# Patient Record
Sex: FEMALE | Race: WHITE | Employment: OTHER | URBAN - METROPOLITAN AREA
[De-identification: names, ages, dates, MRNs, and addresses within clinical notes are randomized per-mention and may not be internally consistent; named-entity substitution may affect disease eponyms.]

---

## 2018-06-26 PROBLEM — Z76.89 ESTABLISHING CARE WITH NEW DOCTOR, ENCOUNTER FOR: Status: ACTIVE | Noted: 2018-06-26

## 2018-06-26 PROBLEM — E78.2 MIXED HYPERLIPIDEMIA: Status: ACTIVE | Noted: 2018-06-26

## 2018-06-26 PROBLEM — M54.50 CHRONIC MIDLINE LOW BACK PAIN WITHOUT SCIATICA: Status: ACTIVE | Noted: 2018-06-26

## 2018-06-26 PROBLEM — E55.9 VITAMIN D DEFICIENCY: Status: ACTIVE | Noted: 2018-06-26

## 2018-06-26 PROBLEM — N65.0 BREAST RECONSTRUCTION DEFORMITY: Status: ACTIVE | Noted: 2018-06-26

## 2018-06-26 PROBLEM — R73.9 ELEVATED BLOOD SUGAR: Status: ACTIVE | Noted: 2018-06-26

## 2018-06-26 PROBLEM — G43.009 MIGRAINE WITHOUT AURA AND WITHOUT STATUS MIGRAINOSUS, NOT INTRACTABLE: Status: ACTIVE | Noted: 2018-06-26

## 2018-06-26 PROBLEM — G89.29 CHRONIC MIDLINE LOW BACK PAIN WITHOUT SCIATICA: Status: ACTIVE | Noted: 2018-06-26

## 2018-06-27 PROBLEM — E78.2 MIXED HYPERLIPIDEMIA: Chronic | Status: ACTIVE | Noted: 2018-06-26

## 2018-06-27 PROBLEM — N65.0 BREAST RECONSTRUCTION DEFORMITY: Chronic | Status: ACTIVE | Noted: 2018-06-26

## 2018-06-27 PROBLEM — G89.29 CHRONIC MIDLINE LOW BACK PAIN WITHOUT SCIATICA: Chronic | Status: ACTIVE | Noted: 2018-06-26

## 2018-06-27 PROBLEM — E55.9 VITAMIN D DEFICIENCY: Chronic | Status: ACTIVE | Noted: 2018-06-26

## 2018-06-27 PROBLEM — M54.50 CHRONIC MIDLINE LOW BACK PAIN WITHOUT SCIATICA: Chronic | Status: ACTIVE | Noted: 2018-06-26

## 2018-07-05 PROBLEM — Z85.3 HISTORY OF BREAST CANCER: Chronic | Status: ACTIVE | Noted: 2018-07-05

## 2018-07-05 PROBLEM — Z85.3 HISTORY OF BREAST CANCER: Status: ACTIVE | Noted: 2018-07-05

## 2018-07-09 PROBLEM — Z79.899 ENCOUNTER FOR LONG-TERM (CURRENT) USE OF MEDICATIONS: Status: ACTIVE | Noted: 2018-07-09

## 2018-07-09 PROBLEM — Z79.899 ENCOUNTER FOR LONG-TERM (CURRENT) USE OF MEDICATIONS: Chronic | Status: ACTIVE | Noted: 2018-07-09

## 2018-07-17 PROBLEM — Z01.419 WOMEN'S ANNUAL ROUTINE GYNECOLOGICAL EXAMINATION: Status: ACTIVE | Noted: 2018-07-17

## 2018-07-27 VITALS — HEIGHT: 65 IN | WEIGHT: 150 LBS | BODY MASS INDEX: 24.99 KG/M2

## 2018-07-27 NOTE — H&P
History and Physical    Patient: Savannah Escalona MRN: [id number]  SSN: [id number]    YOB: 1950  Age: 76 y.o. Sex: female      Vital Signs: /70 P74 R16 SPO2 97%  Visit Vitals    Ht 5' 5\" (1.651 m)    Wt 68 kg (150 lb)    BMI 24.96 kg/m2        Allergies:    Allergies   Allergen Reactions    Codeine Other (comments)     intolerance    Pneumococcal 23-Andreina Ps Vaccine Other (comments)     discomfort    [place] Flextouch U-100 [Insulin Degludec] Other (comments)     headaches       Chief Complaint: disproportion of reconstructed breast, capsular contracture right breast     History of Present Illness: disproportion of reconstructed breast, capsular contracture right breast     Justification for Procedure: disproportion of reconstructed breast, capsular contracture right breast     History:  Past Medical History:   Diagnosis Date    Acute serous otitis media     Acute urticaria     Anemia     Arthritis     Asthma     Breast cancer (HCC)     Depressive disorder     Diabetes mellitus ([address]     Duodenitis     Eczema herpeticum     Epigastric abdominal pain     Essential hypertension     H/O actinic keratosis     H/O blood clots     H/O gastroesophageal reflux (GERD)     H/O headache     H/O seasonal allergies     Hiatal hernia with gastroesophageal reflux     History of breast cancer 7/5/2018    Hx of migraine headaches     Hyperlipidemia     Migraine without aura and without status migrainosus, not intractable 3/22/3125    Other follicular cysts of the skin and subcutaneous tissue     Overweight     Personal history of peptic ulcer disease     Plantar fasciitis     Pulmonary embolism ([address] 2001    Rectal bleeding     Uncontrolled type 2 diabetes mellitus without complication, with long-term current use of insulin ([address] 6/27/2018      Past Surgical History:   Procedure Laterality Date    HX BREAST AUGMENTATION      1 silicone 1 saline implant    HX BREAST BIOPSY      HX MASTECTOMY      x 2    HX OTHER SURGICAL      gastropexy-tummy tuck    HX OTHER SURGICAL      laparoscopic reduction and repair of hiatal hernia    HX TONSILLECTOMY        Social History     Social History    Marital status: UNKNOWN     Spouse name: N/A    Number of children: N/A    Years of education: N/A     Social History Main Topics    Smoking status: Former Smoker    Smokeless tobacco: Never Used    Alcohol use Yes      Comment: occ    Drug use: No    Sexual activity: No     Other Topics Concern    Caffeine Concern No    Exercise Yes    Seat Belt Yes     Social History Narrative      Family History   Problem Relation Age of Onset    Heart Disease Father     Diabetes Father     High Cholesterol Father     No Known Problems Mother     Breast Cancer Maternal Grandmother     Breast Cancer Paternal Aunt     Colon Cancer Neg Hx     Ovarian Cancer Neg Hx     Uterine Cancer Neg Hx        Medications:   Prior to Admission medications    Medication Sig Start Date End Date Taking? Authorizing Provider   insulin detemir U-100 (LEVEMIR FLEXTOUCH U-100 INSULN) 100 unit/mL (3 mL) inpn 20 Units by SubCUTAneous route nightly. Indications: type 2 diabetes mellitus 7/26/18   Conrad Jose MD   atorvastatin (LIPITOR) 20 mg tablet Take 1 Tab by mouth daily. Indications: mixed hyperlipidemia 7/9/18   Conrad Jose MD   OTHER Right and left breasts prosthesis and bras for proper weight balance: DX: Z85.3  Medically necessary 7/5/18   Conrad Jose MD   Blood-Glucose Meter monitoring kit USE TO CHECK BLOOD SUGARS 2 x DAILY 6/28/18   Conrad Jose MD   lancets 28 gauge misc by SubCUTAneous route two (2) times a day.  6/28/18   Conrad Jose MD   glucose blood VI test strips (BLOOD GLUCOSE TEST) strip USE TO CHECK BLOOD SUGARS 2 x DAILY 6/28/18   Conrad Jose MD   Insulin Needles, Disposable, (MICHI PEN NEEDLE) 32 gauge x 5/32\" ndle Use daily 6/28/18   Conrad Jose MD   HYDROcodone-acetaminophen (NORCO) 5-325 mg per tablet Take 1 Tab by mouth every six (6) hours as needed for Pain. Max Daily Amount: 4 Tabs.  Indications: Pain 6/26/18   Trudy Moran MD       Physical Exam:   Heart: regular rate and rhythm, S1, S2 normal, no murmur, click, rub or gallop   Lungs: clear to auscultation bilaterally   Surgical Site: bilateral breasts     Findings/Diagnosis: capsular contracture right breast, disproportion of reconstructed breast    Plan of Care/Planned Procedure: right breast capsulectomy with implant exchange, left breast implant exchange, excise cyst right shoulder    Signed By: Be Felix MD    July 27, 2018

## 2018-07-30 ENCOUNTER — ANESTHESIA EVENT (OUTPATIENT)
Dept: SURGERY | Age: 68
End: 2018-07-30
Payer: MEDICARE

## 2018-07-31 ENCOUNTER — HOSPITAL ENCOUNTER (OUTPATIENT)
Dept: SURGERY | Age: 68
Discharge: HOME | End: 2018-07-31
Payer: MEDICARE

## 2018-07-31 LAB
ATRIAL RATE: 69 BPM
CALCULATED P AXIS, ECG09: 59 DEGREES
CALCULATED R AXIS, ECG10: -11 DEGREES
CALCULATED T AXIS, ECG11: 27 DEGREES
DIAGNOSIS, 93000: NORMAL
GLUCOSE BLD STRIP.AUTO-MCNC: 162 MG/DL (ref 65–100)
P-R INTERVAL, ECG05: 152 MS
Q-T INTERVAL, ECG07: 392 MS
QRS DURATION, ECG06: 76 MS
QTC CALCULATION (BEZET), ECG08: 420 MS
VENTRICULAR RATE, ECG03: 69 BPM

## 2018-07-31 PROCEDURE — 82962 GLUCOSE BLOOD TEST: CPT

## 2018-07-31 PROCEDURE — 93005 ELECTROCARDIOGRAM TRACING: CPT | Performed by: ANESTHESIOLOGY

## 2018-08-01 ENCOUNTER — HOSPITAL ENCOUNTER (OUTPATIENT)
Age: 68
Setting detail: OUTPATIENT SURGERY
Discharge: HOME | End: 2018-08-01
Attending: SURGERY | Admitting: SURGERY
Payer: MEDICARE

## 2018-08-01 ENCOUNTER — ANESTHESIA (OUTPATIENT)
Dept: SURGERY | Age: 68
End: 2018-08-01
Payer: MEDICARE

## 2018-08-01 VITALS
HEART RATE: 57 BPM | TEMPERATURE: 97.5 F | SYSTOLIC BLOOD PRESSURE: 128 MMHG | HEIGHT: 65 IN | RESPIRATION RATE: 17 BRPM | BODY MASS INDEX: 25.81 KG/M2 | OXYGEN SATURATION: 92 % | DIASTOLIC BLOOD PRESSURE: 75 MMHG | WEIGHT: 154.9 LBS

## 2018-08-01 LAB
GLUCOSE BLD STRIP.AUTO-MCNC: 127 MG/DL (ref 65–100)
GLUCOSE BLD STRIP.AUTO-MCNC: 148 MG/DL (ref 65–100)

## 2018-08-01 PROCEDURE — 77030002933 HC SUT MCRYL J&J -A: Performed by: SURGERY

## 2018-08-01 PROCEDURE — 77030008477 HC STYL SATN SLP COVD -A: Performed by: ANESTHESIOLOGY

## 2018-08-01 PROCEDURE — 82962 GLUCOSE BLOOD TEST: CPT

## 2018-08-01 PROCEDURE — 77030018836 HC SOL IRR NACL ICUM -A: Performed by: SURGERY

## 2018-08-01 PROCEDURE — 77030032490 HC SLV COMPR SCD KNE COVD -B: Performed by: SURGERY

## 2018-08-01 PROCEDURE — 74011250636 HC RX REV CODE- 250/636: Performed by: SURGERY

## 2018-08-01 PROCEDURE — 76060000034 HC ANESTHESIA 1.5 TO 2 HR: Performed by: SURGERY

## 2018-08-01 PROCEDURE — 77030008467 HC STPLR SKN COVD -B: Performed by: SURGERY

## 2018-08-01 PROCEDURE — 77030011283 HC ELECTRD NDL COVD -A: Performed by: SURGERY

## 2018-08-01 PROCEDURE — 76210000016 HC OR PH I REC 1 TO 1.5 HR: Performed by: SURGERY

## 2018-08-01 PROCEDURE — C1789 PROSTHESIS, BREAST, IMP: HCPCS | Performed by: SURGERY

## 2018-08-01 PROCEDURE — 74011250636 HC RX REV CODE- 250/636: Performed by: ANESTHESIOLOGY

## 2018-08-01 PROCEDURE — 76210000020 HC REC RM PH II FIRST 0.5 HR: Performed by: SURGERY

## 2018-08-01 PROCEDURE — 74011250637 HC RX REV CODE- 250/637: Performed by: ANESTHESIOLOGY

## 2018-08-01 PROCEDURE — 74011250636 HC RX REV CODE- 250/636

## 2018-08-01 PROCEDURE — 76010000162 HC OR TIME 1.5 TO 2 HR INTENSV-TIER 1: Performed by: SURGERY

## 2018-08-01 PROCEDURE — 77030016570 HC BLNKT BAIR HGGR 3M -B: Performed by: ANESTHESIOLOGY

## 2018-08-01 PROCEDURE — 77030008703 HC TU ET UNCUF COVD -A: Performed by: ANESTHESIOLOGY

## 2018-08-01 PROCEDURE — 77030011640 HC PAD GRND REM COVD -A: Performed by: SURGERY

## 2018-08-01 PROCEDURE — 74011000250 HC RX REV CODE- 250

## 2018-08-01 PROCEDURE — 74011000250 HC RX REV CODE- 250: Performed by: SURGERY

## 2018-08-01 PROCEDURE — 77030020782 HC GWN BAIR PAWS FLX 3M -B: Performed by: ANESTHESIOLOGY

## 2018-08-01 DEVICE — LOW HEIGHT, MODERATE PLUS PROFILE SILICONE GEL-FILLED BREAST IMPLANT, 465CC  TEXTURED CONTOUR SILICONE
Type: IMPLANTABLE DEVICE | Site: BREAST | Status: FUNCTIONAL
Brand: MENTOR MEMORYSHAPE BREAST IMPLANT

## 2018-08-01 RX ORDER — GLYCOPYRROLATE 0.2 MG/ML
INJECTION INTRAMUSCULAR; INTRAVENOUS AS NEEDED
Status: DISCONTINUED | OUTPATIENT
Start: 2018-08-01 | End: 2018-08-01 | Stop reason: HOSPADM

## 2018-08-01 RX ORDER — NALOXONE HYDROCHLORIDE 0.4 MG/ML
0.2 INJECTION, SOLUTION INTRAMUSCULAR; INTRAVENOUS; SUBCUTANEOUS AS NEEDED
Status: DISCONTINUED | OUTPATIENT
Start: 2018-08-01 | End: 2018-08-01 | Stop reason: HOSPADM

## 2018-08-01 RX ORDER — KETOROLAC TROMETHAMINE 30 MG/ML
INJECTION, SOLUTION INTRAMUSCULAR; INTRAVENOUS AS NEEDED
Status: DISCONTINUED | OUTPATIENT
Start: 2018-08-01 | End: 2018-08-01 | Stop reason: HOSPADM

## 2018-08-01 RX ORDER — CEFAZOLIN SODIUM/WATER 2 G/20 ML
2 SYRINGE (ML) INTRAVENOUS ONCE
Status: COMPLETED | OUTPATIENT
Start: 2018-08-01 | End: 2018-08-01

## 2018-08-01 RX ORDER — ROCURONIUM BROMIDE 10 MG/ML
INJECTION, SOLUTION INTRAVENOUS AS NEEDED
Status: DISCONTINUED | OUTPATIENT
Start: 2018-08-01 | End: 2018-08-01 | Stop reason: HOSPADM

## 2018-08-01 RX ORDER — SODIUM CHLORIDE, SODIUM LACTATE, POTASSIUM CHLORIDE, CALCIUM CHLORIDE 600; 310; 30; 20 MG/100ML; MG/100ML; MG/100ML; MG/100ML
75 INJECTION, SOLUTION INTRAVENOUS CONTINUOUS
Status: DISCONTINUED | OUTPATIENT
Start: 2018-08-01 | End: 2018-08-01 | Stop reason: HOSPADM

## 2018-08-01 RX ORDER — FENTANYL CITRATE 50 UG/ML
INJECTION, SOLUTION INTRAMUSCULAR; INTRAVENOUS AS NEEDED
Status: DISCONTINUED | OUTPATIENT
Start: 2018-08-01 | End: 2018-08-01 | Stop reason: HOSPADM

## 2018-08-01 RX ORDER — FENTANYL CITRATE 50 UG/ML
100 INJECTION, SOLUTION INTRAMUSCULAR; INTRAVENOUS ONCE
Status: DISCONTINUED | OUTPATIENT
Start: 2018-08-01 | End: 2018-08-01 | Stop reason: HOSPADM

## 2018-08-01 RX ORDER — SODIUM CHLORIDE, SODIUM LACTATE, POTASSIUM CHLORIDE, CALCIUM CHLORIDE 600; 310; 30; 20 MG/100ML; MG/100ML; MG/100ML; MG/100ML
INJECTION, SOLUTION INTRAVENOUS
Status: DISCONTINUED | OUTPATIENT
Start: 2018-08-01 | End: 2018-08-01 | Stop reason: HOSPADM

## 2018-08-01 RX ORDER — DEXAMETHASONE SODIUM PHOSPHATE 4 MG/ML
INJECTION, SOLUTION INTRA-ARTICULAR; INTRALESIONAL; INTRAMUSCULAR; INTRAVENOUS; SOFT TISSUE AS NEEDED
Status: DISCONTINUED | OUTPATIENT
Start: 2018-08-01 | End: 2018-08-01 | Stop reason: HOSPADM

## 2018-08-01 RX ORDER — ONDANSETRON 2 MG/ML
INJECTION INTRAMUSCULAR; INTRAVENOUS AS NEEDED
Status: DISCONTINUED | OUTPATIENT
Start: 2018-08-01 | End: 2018-08-01 | Stop reason: HOSPADM

## 2018-08-01 RX ORDER — ACETAMINOPHEN 500 MG
1000 TABLET ORAL ONCE
Status: COMPLETED | OUTPATIENT
Start: 2018-08-01 | End: 2018-08-01

## 2018-08-01 RX ORDER — GABAPENTIN 300 MG/1
300 CAPSULE ORAL ONCE
Status: COMPLETED | OUTPATIENT
Start: 2018-08-01 | End: 2018-08-01

## 2018-08-01 RX ORDER — PROPOFOL 10 MG/ML
INJECTION, EMULSION INTRAVENOUS AS NEEDED
Status: DISCONTINUED | OUTPATIENT
Start: 2018-08-01 | End: 2018-08-01 | Stop reason: HOSPADM

## 2018-08-01 RX ORDER — OXYCODONE HYDROCHLORIDE 5 MG/1
5 TABLET ORAL
Status: DISCONTINUED | OUTPATIENT
Start: 2018-08-01 | End: 2018-08-01 | Stop reason: HOSPADM

## 2018-08-01 RX ORDER — MIDAZOLAM HYDROCHLORIDE 1 MG/ML
2 INJECTION, SOLUTION INTRAMUSCULAR; INTRAVENOUS ONCE
Status: DISCONTINUED | OUTPATIENT
Start: 2018-08-01 | End: 2018-08-01 | Stop reason: HOSPADM

## 2018-08-01 RX ORDER — APREPITANT 40 MG/1
40 CAPSULE ORAL ONCE
Status: COMPLETED | OUTPATIENT
Start: 2018-08-01 | End: 2018-08-01

## 2018-08-01 RX ORDER — BUPIVACAINE HYDROCHLORIDE AND EPINEPHRINE 5; 5 MG/ML; UG/ML
INJECTION, SOLUTION EPIDURAL; INTRACAUDAL; PERINEURAL AS NEEDED
Status: DISCONTINUED | OUTPATIENT
Start: 2018-08-01 | End: 2018-08-01 | Stop reason: HOSPADM

## 2018-08-01 RX ORDER — HYDROMORPHONE HYDROCHLORIDE 2 MG/ML
0.5 INJECTION, SOLUTION INTRAMUSCULAR; INTRAVENOUS; SUBCUTANEOUS
Status: DISCONTINUED | OUTPATIENT
Start: 2018-08-01 | End: 2018-08-01 | Stop reason: HOSPADM

## 2018-08-01 RX ORDER — LIDOCAINE HYDROCHLORIDE 20 MG/ML
INJECTION, SOLUTION EPIDURAL; INFILTRATION; INTRACAUDAL; PERINEURAL AS NEEDED
Status: DISCONTINUED | OUTPATIENT
Start: 2018-08-01 | End: 2018-08-01 | Stop reason: HOSPADM

## 2018-08-01 RX ORDER — NEOSTIGMINE METHYLSULFATE 1 MG/ML
INJECTION INTRAVENOUS AS NEEDED
Status: DISCONTINUED | OUTPATIENT
Start: 2018-08-01 | End: 2018-08-01 | Stop reason: HOSPADM

## 2018-08-01 RX ORDER — LIDOCAINE HYDROCHLORIDE 10 MG/ML
0.1 INJECTION INFILTRATION; PERINEURAL AS NEEDED
Status: DISCONTINUED | OUTPATIENT
Start: 2018-08-01 | End: 2018-08-01 | Stop reason: HOSPADM

## 2018-08-01 RX ORDER — MIDAZOLAM HYDROCHLORIDE 1 MG/ML
2 INJECTION, SOLUTION INTRAMUSCULAR; INTRAVENOUS
Status: DISCONTINUED | OUTPATIENT
Start: 2018-08-01 | End: 2018-08-01 | Stop reason: HOSPADM

## 2018-08-01 RX ADMIN — SODIUM CHLORIDE, SODIUM LACTATE, POTASSIUM CHLORIDE, CALCIUM CHLORIDE: 600; 310; 30; 20 INJECTION, SOLUTION INTRAVENOUS at 06:42

## 2018-08-01 RX ADMIN — FENTANYL CITRATE 100 MCG: 50 INJECTION, SOLUTION INTRAMUSCULAR; INTRAVENOUS at 07:45

## 2018-08-01 RX ADMIN — KETOROLAC TROMETHAMINE 15 MG: 30 INJECTION, SOLUTION INTRAMUSCULAR; INTRAVENOUS at 09:02

## 2018-08-01 RX ADMIN — GABAPENTIN 300 MG: 300 CAPSULE ORAL at 06:43

## 2018-08-01 RX ADMIN — Medication 2 G: at 07:47

## 2018-08-01 RX ADMIN — NEOSTIGMINE METHYLSULFATE 3 MG: 1 INJECTION INTRAVENOUS at 09:04

## 2018-08-01 RX ADMIN — DEXAMETHASONE SODIUM PHOSPHATE 4 MG: 4 INJECTION, SOLUTION INTRA-ARTICULAR; INTRALESIONAL; INTRAMUSCULAR; INTRAVENOUS; SOFT TISSUE at 08:00

## 2018-08-01 RX ADMIN — SODIUM CHLORIDE, SODIUM LACTATE, POTASSIUM CHLORIDE, AND CALCIUM CHLORIDE 25 ML/HR: 600; 310; 30; 20 INJECTION, SOLUTION INTRAVENOUS at 06:43

## 2018-08-01 RX ADMIN — ROCURONIUM BROMIDE 45 MG: 10 INJECTION, SOLUTION INTRAVENOUS at 07:45

## 2018-08-01 RX ADMIN — ROCURONIUM BROMIDE 5 MG: 10 INJECTION, SOLUTION INTRAVENOUS at 08:15

## 2018-08-01 RX ADMIN — SODIUM CHLORIDE, SODIUM LACTATE, POTASSIUM CHLORIDE, CALCIUM CHLORIDE: 600; 310; 30; 20 INJECTION, SOLUTION INTRAVENOUS at 09:04

## 2018-08-01 RX ADMIN — LIDOCAINE HYDROCHLORIDE 80 MG: 20 INJECTION, SOLUTION EPIDURAL; INFILTRATION; INTRACAUDAL; PERINEURAL at 07:45

## 2018-08-01 RX ADMIN — PROPOFOL 150 MG: 10 INJECTION, EMULSION INTRAVENOUS at 07:45

## 2018-08-01 RX ADMIN — ONDANSETRON 4 MG: 2 INJECTION INTRAMUSCULAR; INTRAVENOUS at 08:53

## 2018-08-01 RX ADMIN — ACETAMINOPHEN 1000 MG: 500 TABLET, FILM COATED ORAL at 06:44

## 2018-08-01 RX ADMIN — APREPITANT 40 MG: 40 CAPSULE ORAL at 07:06

## 2018-08-01 RX ADMIN — GLYCOPYRROLATE 0.4 MG: 0.2 INJECTION INTRAMUSCULAR; INTRAVENOUS at 09:04

## 2018-08-01 NOTE — INTERVAL H&P NOTE
H&P Update: 
Gordy Lemus was seen and examined. History and physical has been reviewed. The patient has been examined. There have been no significant clinical changes since the completion of the originally dated History and Physical. 
Patient identified by surgeon; surgical site was confirmed by patient and surgeon.  
 
Signed By: Francis Forrester MD   
 August 1, 2018 7:29 AM

## 2018-08-01 NOTE — BRIEF OP NOTE
BRIEF OPERATIVE NOTE Date of Procedure: 8/1/2018 Preoperative Diagnosis: Capsular contracture of breast implant, initial encounter Khoi Barillas Breast reconstruction disproportion [N65.1] History of malignant neoplasm of breast [Z85.3] Postoperative Diagnosis: Capsular contracture of breast implant, initial encounter Procedure(s): RIGHT BREAST CAPSULECTOMY BILATERAL BREAST  IMPLANT EXCHANGES   
 CYST EXCISION RIGHT SHOULDER Surgeon(s) and Role: Chelsea Schuster MD - Primary Surgical Assistant: 0 Surgical Staff: 
Circ-1: Criselda Sarmiento RN Scrub Tech-1: Carolyn Dallas Scrub RN-1: Delia Jean-Baptiste RN Event Time In Incision Start 0800 Incision Close 5286 Anesthesia: General  
Estimated Blood Loss: 0 Specimens: * No specimens in log * Findings: 0 Complications: 0 Implants:  
Implant Name Type Inv. Item Serial No.  Lot No. LRB No. Used Action IMPL BRST LM+ [id number] -- MEMORY SHAPE - [id number]  IMPL BRST LM+ 465ML -- MEMORY SHAPE [id number] MENT[place] [id number] Right 1 Implanted

## 2018-08-01 NOTE — OP NOTES
[facility] REPORT    Nacho Talbert  MR#: [id number]  : 1950  ACCOUNT #: [de-identified]   DATE OF SERVICE: 2018    PREOPERATIVE DIAGNOSIS:  Status post bilateral mastectomies for cancer. POSTOPERATIVE DIAGNOSIS:  Status post bilateral mastectomies for cancer. In addition, this patient has a deflated left breast implant. PROCEDURES PERFORMED:    1. Capsulectomy right breast with implant exchange. 2.  Removal of deflated saline implant, left breast.    SURGEON:  Iron Ornelas MD    ASSISTANT:  0     ANESTHESIA:  General.    ESTIMATED BLOOD LOSS: 50cc    SPECIMENS REMOVED: 0    COMPLICATIONS: 0    IMPLANTS: breast     PROCEDURE NOTE IN DETAIL:  After general anesthesia was obtained, the chest area was prepped and draped under sterile conditions. The old inframammary incision on the right side was opened with a 15 blade and dissection was carried down to the capsule. Soft tissue was elevated off the capsule anteriorly and inferiorly, medially and laterally. The intact implant was then removed. The capsule that had been released was excised and the pocket opened and hemostasis obtained with the cautery. The pocket was irrigated with Betadine and at this point, a [place] MemoryShape breast implant 465 mL was inserted and positioned appropriately. The inframammary incision was then closed with 3-0 Monocryl in an interrupted and running fashion and Steri-Strips were applied. There was no blood loss. Attention was then directed to the left breast where the mastectomy scar was opened laterally and dissection carried down beneath the muscle to a deflated breast implant. The pocket was elevated, the deflated implant removed. A sizer was inserted and it was obviously too big. A decision was made not to insert an implant at all. The incision was then closed in layers using 3-0 Monocryl in an interrupted and running fashion.   Steri-Strips were applied and the operation terminated. There was no blood loss. Addendum:  Having completed the breast surgery, attention was directed to the right shoulder where a 1.5 cm cyst was prepped and draped under sterile conditions. Elliptical excision of the cyst was carried out and closure accomplished with 4-0 Monocryl in an interrupted and running fashion. The cyst measured approximately 1.5 cm. It was not submitted to pathology. Steri-Strips were applied, an Ace wrap applied to the breast, and the operation completed.       MD JUAN Concepcion / JACKY  D: 08/01/2018 09:16     T: 08/01/2018 11:54  JOB #: [id number]

## 2018-08-01 NOTE — ANESTHESIA PREPROCEDURE EVALUATION
Anesthetic History PONV Review of Systems / Medical History Patient summary reviewed and pertinent labs reviewed Pulmonary Asthma : well controlled Neuro/Psych Psychiatric history (Depression ) Cardiovascular Dysrhythmias (Denies) Exercise tolerance: >4 METS Comments: Denies CP, SOB or changes in functional status GI/Hepatic/Renal 
  
GERD: well controlled PUD Endo/Other Diabetes (Newly diagnosed: A1C >15.5 recently but now better controlled. ): type 2 Arthritis Other Findings Physical Exam 
 
Airway Mallampati: II 
TM Distance: 4 - 6 cm Neck ROM: normal range of motion Mouth opening: Normal 
 
 Cardiovascular Rhythm: regular Rate: normal 
 
 
 
 Dental 
 
Dentition: Caps/crowns Pulmonary Breath sounds clear to auscultation Abdominal 
GI exam deferred Other Findings Anesthetic Plan ASA: 3 Anesthesia type: general 
 
 
 
 
Induction: Intravenous Anesthetic plan and risks discussed with: Patient Due to poorly controlled DM will plan for GETA.

## 2018-08-01 NOTE — IP AVS SNAPSHOT
[facility] 
 
 
 [address] [facility] [address] 
[phone] Patient: Carmen Villalobos MRN: [id number] GAJ:[id number] About your hospitalization You were admitted on:  August 1, 2018 You last received care in the:  [facility] PACU You were discharged on:  August 1, 2018 Why you were hospitalized Your primary diagnosis was:  Not on File Follow-up Information Follow up With Details Comments Contact Info Hawa Davidson MD   [facility] [place] [address] Akua Turner 
[phone] Eppie Gosselin, MD Follow up on 8/8/2018 1:30  [facility] [address] 
[phone] Discharge Orders None A check lis indicates which time of day the medication should be taken. My Medications CHANGE how you take these medications Instructions Each Dose to Equal  
 Morning Noon Evening Bedtime  
 atorvastatin 20 mg tablet Commonly known as:  LIPITOR What changed:  when to take this Your last dose was: Your next dose is: Take 1 Tab by mouth daily. Indications: mixed hyperlipidemia 20 mg CONTINUE taking these medications Instructions Each Dose to Equal  
 Morning Noon Evening Bedtime HYDROcodone-acetaminophen 5-325 mg per tablet Commonly known as:  Soniaha Fothergill Your last dose was: Your next dose is: Take 1 Tab by mouth every six (6) hours as needed for Pain. Max Daily Amount: 4 Tabs. Indications: Pain 1 Tab  
    
   
   
   
  
 insulin detemir U-100 100 unit/mL (3 mL) Inpn Commonly known as:  LEVEMIR FLEXTOUCH U-100 INSULN Your last dose was: Your next dose is:    
   
   
 20 Units by SubCUTAneous route nightly. Indications: type 2 diabetes mellitus 20 Units VITAMIN C 500 mg tablet Generic drug:  ascorbic acid (vitamin C) Your last dose was: Your next dose is: Take 1,000 mg by mouth daily. 1000 mg Opioid Education Prescription Opioids: What You Need to Know: 
 
Prescription opioids can be used to help relieve moderate-to-severe pain and are often prescribed following a surgery or injury, or for certain health conditions. These medications can be an important part of treatment but also come with serious risks. Opioids are strong pain medicines. Examples include hydrocodone, oxycodone, fentanyl, and morphine. Heroin is an example of an illegal opioid. It is important to work with your health care provider to make sure you are getting the safest, most effective care. WHAT ARE THE RISKS AND SIDE EFFECTS OF OPIOID USE? Prescription opioids carry serious risks of addiction and overdose, especially with prolonged use. An opioid overdose, often marked by slow breathing, can cause sudden death. The use of prescription opioids can have a number of side effects as well, even when taken as directed. · Tolerance-meaning you might need to take more of a medication for the same pain relief · Physical dependence-meaning you have symptoms of withdrawal when the medication is stopped. Withdrawal symptoms can include nausea, sweating, chills, diarrhea, stomach cramps, and muscle aches. Withdrawal can last up to several weeks, depending on which drug you took and how long you took it. · Increased sensitivity to pain · Constipation · Nausea, vomiting, and dry mouth · Sleepiness and dizziness · Confusion · Depression · Low levels of testosterone that can result in lower sex drive, energy, and strength · Itching and sweating RISKS ARE GREATER WITH:      
· History of drug misuse, substance use disorder, or overdose · Mental health conditions (such as depression or anxiety) · Sleep apnea · Older age (72 years or older) · Pregnancy Avoid alcohol while taking prescription opioids. Also, unless specifically advised by your health care provider, medications to avoid include: · Benzodiazepines (such as Xanax or Valium) · Muscle relaxants (such as Soma or Flexeril) · Hypnotics (such as Ambien or Lunesta) · Other prescription opioids KNOW YOUR OPTIONS Talk to your health care provider about ways to manage your pain that don't involve prescription opioids. Some of these options may actually work better and have fewer risks and side effects. Options may include: 
· Pain relievers such as acetaminophen, ibuprofen, and naproxen · Some medications that are also used for depression or seizures · Physical therapy and exercise · Counseling to help patients learn how to cope better with triggers of pain and stress. · Application of heat or cold compress · Massage therapy · Relaxation techniques Be Informed Make sure you know the name of your medication, how much and how often to take it, and its potential risks & side effects. IF YOU ARE PRESCRIBED OPIOIDS FOR PAIN: 
· Never take opioids in greater amounts or more often than prescribed. Remember the goal is not to be pain-free but to manage your pain at a tolerable level. · Follow up with your primary care provider to: · Work together to create a plan on how to manage your pain. · Talk about ways to help manage your pain that don't involve prescription opioids. · Talk about any and all concerns and side effects. · Help prevent misuse and abuse. · Never sell or share prescription opioids · Help prevent misuse and abuse. · Store prescription opioids in a secure place and out of reach of others (this may include visitors, children, friends, and family).  
· Safely dispose of unused/unwanted prescription opioids: Find your community drug take-back program or your pharmacy mail-back program, or flush them down the toilet, following guidance from the Food and Drug Administration (www.fda.gov/Drugs/ResourcesForYou). · Visit www.cdc.gov/drugoverdose to learn about the risks of opioid abuse and overdose. · If you believe you may be struggling with addiction, tell your health care provider and ask for guidance or call Chuyita Landon at 2-012-941-QOEA. Discharge Instructions Leave ace wrap on for 3 days,then wear bra continuously May get breast wet in 3 days ACTIVITY · As tolerated and as directed by your doctor. · Bathe or shower as directed by your doctor. DIET · Clear liquids until no nausea or vomiting; then light diet for the first day. · Advance to regular diet on second day, unless your doctor orders otherwise. · If nausea and vomiting continues, call your doctor. PAIN 
· Take pain medication as directed by your doctor. · Call your doctor if pain is NOT relieved by medication. · DO NOT take aspirin of blood thinners unless directed by your doctor. DRESSING CARE  
 
 
CALL YOUR DOCTOR IF  
· Excessive bleeding that does not stop after holding pressure over the area · Temperature of 101 degrees F or above · Excessive redness, swelling or bruising, and/ or green or yellow, smelly discharge from incision AFTER ANESTHESIA · For the first 24 hours: DO NOT Drive, Drink alcoholic beverages, or Make important decisions. · Be aware of dizziness following anesthesia and while taking pain medication. APPOINTMENT DATE/ TIME 
 
YOUR DOCTOR'S PHONE NUMBER  
 
 
DISCHARGE SUMMARY from Nurse PATIENT INSTRUCTIONS: 
 
After general anesthesia or intravenous sedation, for 24 hours or while taking prescription Narcotics: · Limit your activities · Do not drive and operate hazardous machinery · Do not make important personal or business decisions · Do  not drink alcoholic beverages · If you have not urinated within 8 hours after discharge, please contact your surgeon on call. *  Please give a list of your current medications to your Primary Care Provider. *  Please update this list whenever your medications are discontinued, doses are 
    changed, or new medications (including over-the-counter products) are added. *  Please carry medication information at all times in case of emergency situations. These are general instructions for a healthy lifestyle: No smoking/ No tobacco products/ Avoid exposure to second hand smoke Surgeon General's Warning:  Quitting smoking now greatly reduces serious risk to your health. Obesity, smoking, and sedentary lifestyle greatly increases your risk for illness A healthy diet, regular physical exercise & weight monitoring are important for maintaining a healthy lifestyle You may be retaining fluid if you have a history of heart failure or if you experience any of the following symptoms:  Weight gain of 3 pounds or more overnight or 5 pounds in a week, increased swelling in our hands or feet or shortness of breath while lying flat in bed. Please call your doctor as soon as you notice any of these symptoms; do not wait until your next office visit. Recognize signs and symptoms of STROKE: 
 
F-face looks uneven A-arms unable to move or move unevenly S-speech slurred or non-existent T-time-call 911 as soon as signs and symptoms begin-DO NOT go Back to bed or wait to see if you get better-TIME IS BRAIN. Introducing [facility]! Dear Josephine Mcdonough: Thank you for requesting a Treehouse account. Our records indicate that you already have an active Treehouse account. You can access your account anytime at [email or web address] Did you know that you can access your hospital and ER discharge instructions at any time in Treehouse? You can also review all of your test results from your hospital stay or ER visit. Additional Information If you have questions, please visit the Frequently Asked Questions section of the MyChart website at [email or web address] Remember, [employer] is NOT to be used for urgent needs. For medical emergencies, dial 911. Now available from your iPhone and Android! Introducing [facility] As a [facility] patient, I wanted to make you aware of our electronic visit tool called [facility]. [employer] Mobile/Mogotest allows you to connect within minutes with a medical provider 24 hours a day, seven days a week via a mobile device or tablet or logging into a secure website from your computer. You can access [facility] from anywhere in the [place]. A virtual visit might be right for you when you have a simple condition and feel like you just dont want to get out of bed, or cant get away from work for an appointment, when your regular [facility] provider is not available (evenings, weekends or holidays), or when youre out of town and need minor care. Electronic visits cost only $49 and if the FaceOn Mobile/Mogotest provider determines a prescription is needed to treat your condition, one can be electronically transmitted to a nearby pharmacy*. Please take a moment to enroll today if you have not already done so. The enrollment process is free and takes just a few minutes. To enroll, please download the [employer] dread to your tablet or phone, or visit [email or web address] to enroll on your computer. And, as an [address] [place] [address] patient with a [employer] account, the results of your visits will be scanned into your electronic medical record and your primary care provider will be able to view the scanned results. We urge you to continue to see your regular [facility] provider for your ongoing medical care.   And while your primary care provider may not be the one available when you seek a [facility] virtual visit, the peace of mind you get from getting a real diagnosis real time can be priceless. For more information on [facility], view our Frequently Asked Questions (FAQs) at [email or web address]. Sincerely, 
 
Jordan Sainz MD 
Chief Medical Officer [employer] *:  certain medications cannot be prescribed via [facility] Providers Seen During Your Hospitalization Provider Specialty Primary office phone Francis Forrester MD Plastic Surgery [phone] Your Primary Care Physician (PCP) Primary Care Physician Office Phone Office Fax Nima [place], [address] You are allergic to the following Allergen Reactions Codeine Other (comments)  
 intolerance Pneumococcal 23-Andreina Ps Vaccine Other (comments)  
 discomfort Maylon Caras Flextouch U-100 (Insulin Degludec) Other (comments)  
 headaches Recent Documentation Height Weight BMI OB Status Smoking Status 1.651 m 70.3 kg 25.78 kg/m2 Menopause Former Smoker Emergency Contacts Name Discharge Info Relation Home Work Mobile Keegan Fowler CAREGIVER [4] Son [22] [phone] Patient Belongings The following personal items are in your possession at time of discharge: 
  Dental Appliances: None         Home Medications: None   Jewelry: None  Clothing: Shirt, Pants, Footwear, Undergarments    Other Valuables: None Please provide this summary of care documentation to your next provider. Signatures-by signing, you are acknowledging that this After Visit Summary has been reviewed with you and you have received a copy. Patient Signature:  ____________________________________________________________ Date:  ____________________________________________________________  
  
Jessenia Mendoza Provider Signature:  ____________________________________________________________ Date:  ____________________________________________________________

## 2018-08-01 NOTE — PERIOP NOTES
RIGHT BREAST IMPLANT REMOVED AND WEIGHED  GRAMS. RIGHT BREAST  OLD IMPLANT DISCARDED. LEFT BREAST OLD IMPLANT REMOVED AND DISCARDED. RIGHT SHOULDER CYST REMOVED AND DISCARDED PER SURGEON REQUEST. HARLEEN CULP.

## 2018-08-01 NOTE — H&P (VIEW-ONLY)
History and Physical 
 
Patient: Alana Douglass MRN: [id number]  SSN: [id number] YOB: 1950  Age: 76 y.o. Sex: female Vital Signs: /70 P74 R16 SPO2 97% Visit Vitals  Ht 5' 5\" (1.651 m)  Wt 68 kg (150 lb)  BMI 24.96 kg/m2 Allergies: Allergies Allergen Reactions  Codeine Other (comments)  
  intolerance  Pneumococcal 23-Andreina Ps Vaccine Other (comments)  
  discomfort [employer] [place] Flextouch U-100 [Insulin Degludec] Other (comments)  
  headaches Chief Complaint: disproportion of reconstructed breast, capsular contracture right breast  
 
History of Present Illness: disproportion of reconstructed breast, capsular contracture right breast  
 
Justification for Procedure: disproportion of reconstructed breast, capsular contracture right breast  
 
History: 
Past Medical History:  
Diagnosis Date  Acute serous otitis media  Acute urticaria  Anemia  Arthritis  Asthma  Breast cancer ([address]  Depressive disorder  Diabetes mellitus ([address]  Duodenitis  Eczema herpeticum  Epigastric abdominal pain  Essential hypertension  H/O actinic keratosis  H/O blood clots  H/O gastroesophageal reflux (GERD)  H/O headache  H/O seasonal allergies  Hiatal hernia with gastroesophageal reflux  History of breast cancer 7/5/2018  Hx of migraine headaches  Hyperlipidemia  Migraine without aura and without status migrainosus, not intractable 6/26/2018  Other follicular cysts of the skin and subcutaneous tissue  Overweight  Personal history of peptic ulcer disease  Plantar fasciitis  Pulmonary embolism ([address] 2001  Rectal bleeding  Uncontrolled type 2 diabetes mellitus without complication, with long-term current use of insulin ([address] 6/27/2018 Past Surgical History:  
Procedure Laterality Date  HX BREAST AUGMENTATION    
 1 silicone 1 saline implant  HX BREAST BIOPSY  HX MASTECTOMY    
 x 2  
 HX OTHER SURGICAL    
 gastropexy-tummy tuck  HX OTHER SURGICAL    
 laparoscopic reduction and repair of hiatal hernia  HX TONSILLECTOMY Social History Social History  Marital status: UNKNOWN Spouse name: N/A  
 Number of children: N/A  
 Years of education: N/A Social History Main Topics  Smoking status: Former Smoker  Smokeless tobacco: Never Used  Alcohol use Yes Comment: occ  Drug use: No  
 Sexual activity: No  
 
Other Topics Concern  Caffeine Concern No  
 Exercise Yes 2000 [facility] Yes Social History Narrative Family History Problem Relation Age of Onset  Heart Disease Father  Diabetes Father  High Cholesterol Father  No Known Problems Mother  Breast Cancer Maternal Grandmother  Breast Cancer Paternal Aunt  Colon Cancer Neg Hx   
 Ovarian Cancer Neg Hx  Uterine Cancer Neg Hx Medications:  
Prior to Admission medications Medication Sig Start Date End Date Taking? Authorizing Provider  
insulin detemir U-100 (LEVEMIR FLEXTOUCH U-100 INSULN) 100 unit/mL (3 mL) inpn 20 Units by SubCUTAneous route nightly. Indications: type 2 diabetes mellitus 7/26/18   Salvador Barraza MD  
atorvastatin (LIPITOR) 20 mg tablet Take 1 Tab by mouth daily. Indications: mixed hyperlipidemia 7/9/18   Salvador Barraza MD  
OTHER Right and left breasts prosthesis and bras for proper weight balance: DX: Z85.3  Medically necessary 7/5/18   Salvador Barraza MD  
Blood-Glucose Meter monitoring kit USE TO CHECK BLOOD SUGARS 2 x DAILY 6/28/18   Salvador Barraza MD  
lancets 28 gauge misc by SubCUTAneous route two (2) times a day.  6/28/18   Salvador Barraza MD  
glucose blood VI test strips (BLOOD GLUCOSE TEST) strip USE TO CHECK BLOOD SUGARS 2 x DAILY 6/28/18   Salvador Barraza MD  
Insulin Needles, Disposable, (MICHI PEN NEEDLE) 32 gauge x 5/32\" ndle Use daily 6/28/18   Salvador Barraza MD  
HYDROcodone-acetaminophen (NORCO) 5-325 mg per tablet Take 1 Tab by mouth every six (6) hours as needed for Pain. Max Daily Amount: 4 Tabs. Indications: Pain 6/26/18   Trudy Moran MD  
 
 
Physical Exam:  
Heart: regular rate and rhythm, S1, S2 normal, no murmur, click, rub or gallop Lungs: clear to auscultation bilaterally Surgical Site: bilateral breasts Findings/Diagnosis: capsular contracture right breast, disproportion of reconstructed breast 
 
Plan of Care/Planned Procedure: right breast capsulectomy with implant exchange, left breast implant exchange, excise cyst right shoulder Signed By: Be Felix MD  
 July 27, 2018

## 2018-08-01 NOTE — DISCHARGE INSTRUCTIONS
Leave ace wrap on for 3 days,then wear bra continuously  May get breast wet in 3 days    ACTIVITY  · As tolerated and as directed by your doctor. · Bathe or shower as directed by your doctor. DIET  · Clear liquids until no nausea or vomiting; then light diet for the first day. · Advance to regular diet on second day, unless your doctor orders otherwise. · If nausea and vomiting continues, call your doctor. PAIN  · Take pain medication as directed by your doctor. · Call your doctor if pain is NOT relieved by medication. · DO NOT take aspirin of blood thinners unless directed by your doctor. DRESSING CARE       CALL YOUR DOCTOR IF   · Excessive bleeding that does not stop after holding pressure over the area  · Temperature of 101 degrees F or above  · Excessive redness, swelling or bruising, and/ or green or yellow, smelly discharge from incision    AFTER ANESTHESIA   · For the first 24 hours: DO NOT Drive, Drink alcoholic beverages, or Make important decisions. · Be aware of dizziness following anesthesia and while taking pain medication. APPOINTMENT DATE/ TIME    YOUR DOCTOR'S PHONE NUMBER       DISCHARGE SUMMARY from Nurse    PATIENT INSTRUCTIONS:    After general anesthesia or intravenous sedation, for 24 hours or while taking prescription Narcotics:  · Limit your activities  · Do not drive and operate hazardous machinery  · Do not make important personal or business decisions  · Do  not drink alcoholic beverages  · If you have not urinated within 8 hours after discharge, please contact your surgeon on call. *  Please give a list of your current medications to your Primary Care Provider. *  Please update this list whenever your medications are discontinued, doses are      changed, or new medications (including over-the-counter products) are added. *  Please carry medication information at all times in case of emergency situations.       These are general instructions for a healthy lifestyle:    No smoking/ No tobacco products/ Avoid exposure to second hand smoke    Surgeon General's Warning:  Quitting smoking now greatly reduces serious risk to your health. Obesity, smoking, and sedentary lifestyle greatly increases your risk for illness    A healthy diet, regular physical exercise & weight monitoring are important for maintaining a healthy lifestyle    You may be retaining fluid if you have a history of heart failure or if you experience any of the following symptoms:  Weight gain of 3 pounds or more overnight or 5 pounds in a week, increased swelling in our hands or feet or shortness of breath while lying flat in bed. Please call your doctor as soon as you notice any of these symptoms; do not wait until your next office visit. Recognize signs and symptoms of STROKE:    F-face looks uneven    A-arms unable to move or move unevenly    S-speech slurred or non-existent    T-time-call 911 as soon as signs and symptoms begin-DO NOT go       Back to bed or wait to see if you get better-TIME IS BRAIN.

## 2019-01-11 ENCOUNTER — HOSPITAL ENCOUNTER (OUTPATIENT)
Dept: MAMMOGRAPHY | Age: 69
Discharge: HOME | End: 2019-01-11
Attending: FAMILY MEDICINE
Payer: MEDICARE

## 2019-01-11 ENCOUNTER — HOSPITAL ENCOUNTER (OUTPATIENT)
Dept: GENERAL RADIOLOGY | Age: 69
Discharge: HOME | End: 2019-01-11
Attending: FAMILY MEDICINE
Payer: MEDICARE

## 2019-01-11 DIAGNOSIS — M25.512 CHRONIC PAIN OF BOTH SHOULDERS: ICD-10-CM

## 2019-01-11 DIAGNOSIS — Z78.0 MENOPAUSE: ICD-10-CM

## 2019-01-11 DIAGNOSIS — M54.2 NECK PAIN ON LEFT SIDE: ICD-10-CM

## 2019-01-11 DIAGNOSIS — M25.511 CHRONIC PAIN OF BOTH SHOULDERS: ICD-10-CM

## 2019-01-11 DIAGNOSIS — G89.29 CHRONIC PAIN OF BOTH SHOULDERS: ICD-10-CM

## 2019-01-11 PROCEDURE — 72040 X-RAY EXAM NECK SPINE 2-3 VW: CPT

## 2019-01-11 PROCEDURE — 77080 DXA BONE DENSITY AXIAL: CPT

## 2019-01-12 PROBLEM — M85.88 OSTEOPENIA OF LUMBAR SPINE: Status: ACTIVE | Noted: 2019-01-12

## 2019-01-13 NOTE — PROGRESS NOTES
Bone density test shows osteopenia. Start Calcium twice daily. Will send to her pharmacy. Will need to recheck in 5 years.

## 2019-04-03 ENCOUNTER — HOSPITAL ENCOUNTER (OUTPATIENT)
Dept: DIABETES SERVICES | Age: 69
Discharge: HOME | End: 2019-04-03
Payer: MEDICARE

## 2019-04-03 PROCEDURE — G0108 DIAB MANAGE TRN  PER INDIV: HCPCS

## 2019-04-24 PROBLEM — E11.9 DIABETES MELLITUS WITHOUT COMPLICATION (HCC): Status: ACTIVE | Noted: 2019-04-24

## 2019-04-24 PROBLEM — H60.543 DERMATITIS OF BOTH EAR CANALS: Status: ACTIVE | Noted: 2019-04-24

## 2019-04-24 PROBLEM — Z86.79 PERSONAL HISTORY OF CARDIAC ARRHYTHMIA: Status: ACTIVE | Noted: 2019-04-24

## 2019-04-24 PROBLEM — H61.23 CERUMEN DEBRIS ON TYMPANIC MEMBRANE OF BOTH EARS: Status: ACTIVE | Noted: 2019-04-24

## 2019-04-24 PROBLEM — M54.2 NECK PAIN ON LEFT SIDE: Status: ACTIVE | Noted: 2019-04-24

## 2019-04-24 PROBLEM — G89.29 CHRONIC LEFT SHOULDER PAIN: Status: ACTIVE | Noted: 2019-04-24

## 2019-04-24 PROBLEM — M25.512 CHRONIC LEFT SHOULDER PAIN: Status: ACTIVE | Noted: 2019-04-24

## 2019-04-25 ENCOUNTER — HOSPITAL ENCOUNTER (OUTPATIENT)
Dept: DIABETES SERVICES | Age: 69
Discharge: HOME | End: 2019-04-25
Payer: MEDICARE

## 2019-04-25 PROCEDURE — G0109 DIAB MANAGE TRN IND/GROUP: HCPCS

## 2019-04-25 NOTE — PROGRESS NOTES
Participant attended Diabetes #1 session today. Topics included: Characteristics/pathophysiology type 1/type 2 diabetes; Goal/acceptable blood glucose ranges/Hgb A1C/interpreting/using results;meters, continuous glucose monitors and insulin pumps. Using medications safely; Sick day management; Prevention/detection/treatment of acute complications. - Verbalized understanding of material covered. ; Demonstrated ability of her personal glucometer use. -Goal for next session Nutrition One. 
-Anticipated adherence is  good  
-Problems/barriers may be none anticipated

## 2019-04-26 PROCEDURE — G0109 DIAB MANAGE TRN IND/GROUP: HCPCS

## 2019-05-02 ENCOUNTER — HOSPITAL ENCOUNTER (OUTPATIENT)
Dept: DIABETES SERVICES | Age: 69
Discharge: HOME | End: 2019-05-02
Payer: MEDICARE

## 2019-05-02 PROCEDURE — G0109 DIAB MANAGE TRN IND/GROUP: HCPCS

## 2019-05-06 PROBLEM — M25.579 PAIN IN JOINT, ANKLE AND FOOT: Status: ACTIVE | Noted: 2019-05-06

## 2019-05-06 PROBLEM — M72.2 PLANTAR FASCIAL FIBROMATOSIS: Status: ACTIVE | Noted: 2019-05-06

## 2019-05-16 ENCOUNTER — HOSPITAL ENCOUNTER (OUTPATIENT)
Dept: DIABETES SERVICES | Age: 69
Discharge: HOME | End: 2019-05-16
Payer: MEDICARE

## 2019-05-16 PROCEDURE — G0109 DIAB MANAGE TRN IND/GROUP: HCPCS

## 2019-05-16 NOTE — PROGRESS NOTES
Attended nutrition diabetes #2 group session today. Topics included: plate method for portion control; fiber and sodium guidelines; sugar substitutes; alcohol; eating out; recipe modification; label reading. Participant's nutrition goal: To lower A1C she will limit carbs to 30-45 grams per meal and re-evaluate in 3 months. Participant's nutrition support plan: Use phone apps, weight watchers and info given in class. Barriers identified by participant: lazy and doesn't want to cook. Voiced/demonstrated understanding of material covered. Anticipated adherence is good. Plan for follow up is : will be sent a follow up questionnaire in 3 months.

## 2019-05-30 ENCOUNTER — HOSPITAL ENCOUNTER (OUTPATIENT)
Dept: DIABETES SERVICES | Age: 69
Discharge: HOME | End: 2019-05-30
Payer: MEDICARE

## 2019-05-30 PROCEDURE — G0109 DIAB MANAGE TRN IND/GROUP: HCPCS

## 2019-05-30 NOTE — PROGRESS NOTES
Participant attended Diabetes #2 session today. Topics included: Prevention/detection/treatment of chronic complications; sleep apnea; Developing strategies to promote health/change behavior/recommended screenings; Developing strategies to address psychosocial issues; Goal setting. Participants goal/support plan includes  Diabetes Goal:  To prevent potential lows, I will get organized at home and I will carry a carbohydrate source to treat low blood sugar What? Glucose tablets. Diabetes Plan: To have glucose tablets.   Problems/barriers may be:none anticipated   Plan for follow up/Recommendations: mail follow up survey in 3 months

## 2019-06-25 ENCOUNTER — HOSPITAL ENCOUNTER (OUTPATIENT)
Dept: DIABETES SERVICES | Age: 69
Discharge: HOME | End: 2019-06-25
Payer: MEDICARE

## 2019-06-25 PROCEDURE — G0109 DIAB MANAGE TRN IND/GROUP: HCPCS

## 2019-06-25 NOTE — PROGRESS NOTES
Attended diabetes follow up group class.  Open forum for clients to ask questions based on entire diabetes self management education program. Education topics are driven in this class based on clients' individual questions and needs. Topics included: proteins, meal planning, label reading, weight loss, eating out, sugar substitutes, Hgb A1C, stress/depression, glucometer testing, and signs/symptoms high and low blood sugars and treatment.

## (undated) DEVICE — SUTURE MCRYL SZ 4-0 L18IN ABSRB UD L19MM PS-2 3/8 CIR PRIM Y496G

## (undated) DEVICE — SURGICAL PROCEDURE PACK BASIC ST FRANCIS

## (undated) DEVICE — REM POLYHESIVE ADULT PATIENT RETURN ELECTRODE: Brand: VALLEYLAB

## (undated) DEVICE — SLIM BODY SKIN STAPLER: Brand: APPOSE ULC

## (undated) DEVICE — DRAPE,CHEST,FENES,15X10,STERIL: Brand: MEDLINE

## (undated) DEVICE — SUTURE MCRYL SZ 3-0 L27IN ABSRB UD L19MM PS-2 3/8 CIR PRIM Y427H

## (undated) DEVICE — SOLUTION IV 1000ML 0.9% SOD CHL

## (undated) DEVICE — ELECTRODE NDL 2.8IN COAT VALLEYLAB

## (undated) DEVICE — (D)PREP SKN CHLRAPRP APPL 26ML -- CONVERT TO ITEM 371833

## (undated) DEVICE — 2000CC GUARDIAN II: Brand: GUARDIAN

## (undated) DEVICE — KENDALL SCD EXPRESS SLEEVES, KNEE LENGTH, MEDIUM: Brand: KENDALL SCD

## (undated) DEVICE — BUTTON SWITCH PENCIL BLADE ELECTRODE, HOLSTER: Brand: EDGE

## (undated) DEVICE — 3M™ STERI-STRIP™ REINFORCED ADHESIVE SKIN CLOSURES, R1548, 1 IN X 5 IN (25 MM X 125 MM), 4 STRIPS/ENVELOPE: Brand: 3M™ STERI-STRIP™

## (undated) DEVICE — AMD ANTIMICROBIAL GAUZE SPONGES,12 PLY USP TYPE VII, 0.2% POLYHEXAMETHYLENE BIGUANIDE HCI (PHMB): Brand: CURITY